# Patient Record
Sex: MALE | Race: WHITE
[De-identification: names, ages, dates, MRNs, and addresses within clinical notes are randomized per-mention and may not be internally consistent; named-entity substitution may affect disease eponyms.]

---

## 2018-09-11 ENCOUNTER — HOSPITAL ENCOUNTER (OUTPATIENT)
Age: 48
End: 2018-09-11
Payer: COMMERCIAL

## 2018-09-11 DIAGNOSIS — Z01.818: Primary | ICD-10-CM

## 2018-09-11 LAB
ALANINE AMINOTRANSFER ALT/SGPT: 26 U/L (ref 16–61)
ALBUMIN SERPL-MCNC: 3.7 G/DL (ref 3.2–5)
ALKALINE PHOSPHATASE: 58 U/L (ref 45–117)
ANION GAP: 11 (ref 5–15)
AST(SGOT): 20 U/L (ref 15–37)
BUN SERPL-MCNC: 21 MG/DL (ref 7–18)
BUN/CREAT RATIO: 21 RATIO (ref 10–20)
CALCIUM SERPL-MCNC: 8.7 MG/DL (ref 8.5–10.1)
CARBON DIOXIDE: 26 MMOL/L (ref 21–32)
CHLORIDE: 104 MMOL/L (ref 98–107)
DEPRECATED RDW RBC: 46.7 FL (ref 35.1–43.9)
DIFFERENTIAL COMMENT: (no result)
DIFFERENTIAL INDICATED: (no result)
ERYTHROCYTE [DISTWIDTH] IN BLOOD: 14.5 % (ref 11.6–14.6)
EST GLOM FILT RATE - AFR AMER: 102 ML/MIN (ref 60–?)
GLOBULIN: 5 G/DL (ref 2.2–4.2)
GLUCOSE: 95 MG/DL (ref 74–106)
HCT VFR BLD AUTO: 40.1 % (ref 40–54)
HEMOGLOBIN: 13.2 G/DL (ref 13–16.5)
HGB BLD-MCNC: 13.2 G/DL (ref 13–16.5)
IMMATURE GRANULOCYTES COUNT: 0 X10^3/UL (ref 0–0)
MANUAL DIF COMMENT BLD-IMP: (no result)
MCV RBC: 88.1 FL (ref 80–94)
MEAN CORP HGB CONC: 32.9 G/GL (ref 32–36)
MEAN PLATELET VOL.: 8.7 FL (ref 6.2–12)
PLATELET # BLD: 199 K/MM3 (ref 150–450)
PLATELET COUNT: 199 K/MM3 (ref 150–450)
POSITIVE COUNT: NO
POSITIVE DIFFERENTIAL: YES
POSITIVE MORPHOLOGY: NO
POTASSIUM: 4 MMOL/L (ref 3.5–5.1)
RBC # BLD AUTO: 4.55 M/MM3 (ref 4.6–6.2)
RBC DISTRIBUTION WIDTH CV: 14.5 % (ref 11.6–14.6)
RBC DISTRIBUTION WIDTH SD: 46.7 FL (ref 35.1–43.9)
RBC MORPH BLD: (no result) NORMAL
RED CELL MORPHOLOGY: (no result) NORMAL
SCAN SMEAR PER REVIEW CRITERIA: (no result)
WBC # BLD AUTO: 2.9 K/MM3 (ref 4.4–11)
WHITE BLOOD COUNT: 2.9 K/MM3 (ref 4.4–11)

## 2018-09-11 PROCEDURE — 85025 COMPLETE CBC W/AUTO DIFF WBC: CPT

## 2018-09-11 PROCEDURE — 80053 COMPREHEN METABOLIC PANEL: CPT

## 2018-09-11 PROCEDURE — 36415 COLL VENOUS BLD VENIPUNCTURE: CPT

## 2018-09-21 ENCOUNTER — HOSPITAL ENCOUNTER (OUTPATIENT)
Dept: HOSPITAL 100 - SDC | Age: 48
Discharge: HOME | End: 2018-09-21
Payer: COMMERCIAL

## 2018-09-21 VITALS
TEMPERATURE: 98.78 F | RESPIRATION RATE: 16 BRPM | OXYGEN SATURATION: 97 % | DIASTOLIC BLOOD PRESSURE: 89 MMHG | HEART RATE: 72 BPM | SYSTOLIC BLOOD PRESSURE: 130 MMHG

## 2018-09-21 VITALS
HEART RATE: 76 BPM | DIASTOLIC BLOOD PRESSURE: 89 MMHG | OXYGEN SATURATION: 100 % | SYSTOLIC BLOOD PRESSURE: 130 MMHG | RESPIRATION RATE: 16 BRPM | TEMPERATURE: 98 F

## 2018-09-21 VITALS
OXYGEN SATURATION: 100 % | DIASTOLIC BLOOD PRESSURE: 89 MMHG | TEMPERATURE: 98.1 F | HEART RATE: 79 BPM | RESPIRATION RATE: 18 BRPM | SYSTOLIC BLOOD PRESSURE: 130 MMHG

## 2018-09-21 VITALS — SYSTOLIC BLOOD PRESSURE: 130 MMHG | DIASTOLIC BLOOD PRESSURE: 89 MMHG

## 2018-09-21 VITALS — BODY MASS INDEX: 23.8 KG/M2

## 2018-09-21 VITALS
SYSTOLIC BLOOD PRESSURE: 118 MMHG | DIASTOLIC BLOOD PRESSURE: 89 MMHG | HEART RATE: 76 BPM | RESPIRATION RATE: 16 BRPM | OXYGEN SATURATION: 100 %

## 2018-09-21 VITALS
RESPIRATION RATE: 18 BRPM | SYSTOLIC BLOOD PRESSURE: 133 MMHG | DIASTOLIC BLOOD PRESSURE: 89 MMHG | HEART RATE: 75 BPM | OXYGEN SATURATION: 100 %

## 2018-09-21 VITALS
HEART RATE: 74 BPM | DIASTOLIC BLOOD PRESSURE: 81 MMHG | OXYGEN SATURATION: 100 % | RESPIRATION RATE: 16 BRPM | SYSTOLIC BLOOD PRESSURE: 130 MMHG

## 2018-09-21 DIAGNOSIS — Z87.891: ICD-10-CM

## 2018-09-21 DIAGNOSIS — M20.41: Primary | ICD-10-CM

## 2018-09-21 DIAGNOSIS — M06.371: ICD-10-CM

## 2018-09-21 PROCEDURE — 73620 X-RAY EXAM OF FOOT: CPT

## 2018-09-21 PROCEDURE — 88304 TISSUE EXAM BY PATHOLOGIST: CPT

## 2018-09-21 PROCEDURE — 88305 TISSUE EXAM BY PATHOLOGIST: CPT

## 2018-09-21 PROCEDURE — 28090 REMOVAL OF FOOT LESION: CPT

## 2018-09-21 PROCEDURE — 88311 DECALCIFY TISSUE: CPT

## 2018-09-21 PROCEDURE — 76000 FLUOROSCOPY <1 HR PHYS/QHP: CPT

## 2018-09-21 PROCEDURE — 28285 REPAIR OF HAMMERTOE: CPT

## 2018-09-21 RX ADMIN — CEFAZOLIN 150 GM: 10 INJECTION, POWDER, FOR SOLUTION INTRAVENOUS at 07:29

## 2018-11-27 ENCOUNTER — HOSPITAL ENCOUNTER (OUTPATIENT)
Age: 48
End: 2018-11-27
Payer: COMMERCIAL

## 2018-11-27 DIAGNOSIS — M05.79: Primary | ICD-10-CM

## 2018-11-27 LAB — CRP SERPL-MCNC: < 2.9 MG/L (ref 0–3)

## 2018-11-27 PROCEDURE — 86140 C-REACTIVE PROTEIN: CPT

## 2018-11-27 PROCEDURE — 85652 RBC SED RATE AUTOMATED: CPT

## 2019-11-25 ENCOUNTER — HOSPITAL ENCOUNTER (OUTPATIENT)
Age: 49
End: 2019-11-25
Payer: COMMERCIAL

## 2019-11-25 VITALS — BODY MASS INDEX: 23.8 KG/M2

## 2019-11-25 DIAGNOSIS — S62.522A: Primary | ICD-10-CM
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
## 2019-11-25 PROCEDURE — 73140 X-RAY EXAM OF FINGER(S): CPT

## 2019-11-25 PROCEDURE — 73130 X-RAY EXAM OF HAND: CPT

## 2022-12-14 ENCOUNTER — HOSPITAL ENCOUNTER (OUTPATIENT)
Age: 52
Discharge: HOME | End: 2022-12-14
Payer: COMMERCIAL

## 2022-12-14 DIAGNOSIS — N48.9: Primary | ICD-10-CM

## 2022-12-14 LAB — SAMPLE ADEQUACY CONTROL: (no result)

## 2022-12-14 PROCEDURE — 36415 COLL VENOUS BLD VENIPUNCTURE: CPT

## 2022-12-14 PROCEDURE — 86780 TREPONEMA PALLIDUM: CPT

## 2022-12-14 PROCEDURE — 87491 CHLMYD TRACH DNA AMP PROBE: CPT

## 2022-12-14 PROCEDURE — 86696 HERPES SIMPLEX TYPE 2 TEST: CPT

## 2022-12-14 PROCEDURE — 86695 HERPES SIMPLEX TYPE 1 TEST: CPT

## 2022-12-14 PROCEDURE — 87591 N.GONORRHOEAE DNA AMP PROB: CPT

## 2023-11-13 ENCOUNTER — APPOINTMENT (OUTPATIENT)
Dept: RHEUMATOLOGY | Facility: CLINIC | Age: 53
End: 2023-11-13
Payer: COMMERCIAL

## 2024-06-09 PROBLEM — Z79.622 LONG-TERM CURRENT USE OF TOFACITINIB: Status: ACTIVE | Noted: 2024-06-09

## 2024-06-09 PROBLEM — M65.351 TRIGGER LITTLE FINGER OF RIGHT HAND: Status: ACTIVE | Noted: 2024-06-09

## 2024-06-09 PROBLEM — M05.79 RHEUMATOID ARTHRITIS WITH RHEUMATOID FACTOR OF MULTIPLE SITES WITHOUT ORGAN OR SYSTEMS INVOLVEMENT (MULTI): Status: ACTIVE | Noted: 2024-06-09

## 2024-06-09 PROBLEM — M47.812 CERVICAL SPONDYLOSIS: Status: ACTIVE | Noted: 2024-06-09

## 2024-06-09 PROBLEM — M47.816 SPONDYLOSIS WITHOUT MYELOPATHY OR RADICULOPATHY, LUMBAR REGION: Status: ACTIVE | Noted: 2024-06-09

## 2024-06-10 ENCOUNTER — OFFICE VISIT (OUTPATIENT)
Dept: RHEUMATOLOGY | Facility: CLINIC | Age: 54
End: 2024-06-10
Payer: COMMERCIAL

## 2024-06-10 VITALS
HEIGHT: 73 IN | BODY MASS INDEX: 26.74 KG/M2 | OXYGEN SATURATION: 97 % | TEMPERATURE: 97.7 F | HEART RATE: 73 BPM | WEIGHT: 201.8 LBS | SYSTOLIC BLOOD PRESSURE: 141 MMHG | DIASTOLIC BLOOD PRESSURE: 84 MMHG

## 2024-06-10 DIAGNOSIS — Z79.622 LONG-TERM CURRENT USE OF TOFACITINIB: ICD-10-CM

## 2024-06-10 DIAGNOSIS — M05.79 RHEUMATOID ARTHRITIS WITH RHEUMATOID FACTOR OF MULTIPLE SITES WITHOUT ORGAN OR SYSTEMS INVOLVEMENT (MULTI): Primary | ICD-10-CM

## 2024-06-10 PROBLEM — H04.129 DRY EYE SYNDROME: Status: RESOLVED | Noted: 2017-01-23 | Resolved: 2024-06-10

## 2024-06-10 PROCEDURE — 99214 OFFICE O/P EST MOD 30 MIN: CPT | Performed by: INTERNAL MEDICINE

## 2024-06-10 PROCEDURE — 1036F TOBACCO NON-USER: CPT | Performed by: INTERNAL MEDICINE

## 2024-06-10 RX ORDER — TOFACITINIB 11 MG/1
11 TABLET, FILM COATED, EXTENDED RELEASE ORAL DAILY
Qty: 90 TABLET | Refills: 3 | Status: SHIPPED | OUTPATIENT
Start: 2024-06-10 | End: 2025-06-10

## 2024-06-10 RX ORDER — TOFACITINIB 11 MG/1
11 TABLET, FILM COATED, EXTENDED RELEASE ORAL DAILY
COMMUNITY
Start: 2024-06-04 | End: 2024-06-10 | Stop reason: SDUPTHER

## 2024-06-10 ASSESSMENT — ENCOUNTER SYMPTOMS
COUGH: 0
SHORTNESS OF BREATH: 0
MYALGIAS: 0
FATIGUE: 0
ARTHRALGIAS: 1
BACK PAIN: 0
JOINT SWELLING: 0
NUMBNESS: 0
FEVER: 0
WEAKNESS: 0
COLOR CHANGE: 0

## 2024-06-10 ASSESSMENT — PATIENT HEALTH QUESTIONNAIRE - PHQ9
1. LITTLE INTEREST OR PLEASURE IN DOING THINGS: NOT AT ALL
2. FEELING DOWN, DEPRESSED OR HOPELESS: NOT AT ALL
SUM OF ALL RESPONSES TO PHQ9 QUESTIONS 1 AND 2: 0

## 2024-06-10 NOTE — LETTER
Leanne 10, 2024     Luly Chirinos MD  [address]  [address]  [facility] [address]    Patient: Eliceo Ojeda   YOB: 1970   Date of Visit: 6/10/2024       Dear Dr. Luly Chirinos MD:    Thank you for referring Eliceo Ojeda to me for evaluation. Below are my notes for this visit  If you have questions, please do not hesitate to call me. I look forward to following your patient along with you.       Sincerely,     Tiffanie Currie MD      CC: No Recipients  ______________________________________________________________________________________                                                    [facility] RHEUMATOLOGY     AND INTERNAL MEDICINE    RHEUMATOLOGY PROGRESS NOTE  Eliceo Ojeda 54 y.o. male  Chief Complaint   Patient presents with   • Arthritis       SUBJECTIVE  Pt reports he is dong well.  Minimal pain.  Continues to have weakness in his  in her Right hand which is unchanged.   No new areas of pain.  No side effects with medication      Records since last seen reviewed in [facility], [facility] and [facility] Record  Patient Active Problem List    Diagnosis Date Noted   • Cervical spondylosis 06/09/2024   • Spondylosis without myelopathy or radiculopathy, lumbar region 06/09/2024   • Long-term current use of tofacitinib 06/09/2024   • Rheumatoid arthritis with rheumatoid factor of multiple sites without organ or systems involvement (Multi) 06/09/2024   • Trigger little finger of right hand 06/09/2024     Past Medical History:   Diagnosis Date   • Consumes 2 to 3 servings of caffeine per day    • Dry eye syndrome 01/23/2017   • 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   Respiratory:  Negative for cough and shortness of breath.    Cardiovascular:  Negative for leg swelling.   Musculoskeletal:  Positive for arthralgias. Negative for back pain, gait problem, joint swelling and myalgias.   Skin:  Negative for color change and rash.   Neurological:  Negative for weakness and numbness.     Social History     Tobacco Use   • Smoking status: Former     Types: Cigarettes   • Smokeless tobacco: Never   Substance Use Topics   • Alcohol use: Yes     PHYSICAL EXAM  /84   Pulse 73   Temp 36.5 °C (97.7 °F)   Ht 1.854 m (6' 1\")   Wt 91.5 kg (201 lb 12.8 oz)   SpO2 97%   BMI 26.62 kg/m²   Physical Exam  Vitals reviewed.   Constitutional:       General: He is not in acute distress.     Appearance: Normal appearance.   HENT:      Head: Normocephalic and atraumatic.   Eyes:      General: No scleral icterus.     Extraocular Movements: Extraocular movements intact.      Conjunctiva/sclera: Conjunctivae normal.      Pupils: Pupils are equal, round, and reactive to light.   Pulmonary:      Effort: Pulmonary effort is normal. No respiratory distress.   Musculoskeletal:         General: No swelling, tenderness or deformity. Normal range of motion.      Cervical back: Normal range of motion and neck supple. No tenderness.      Right lower leg: No edema.      Left lower leg: No edema.      Comments: No synovitis noted on exam  Difficulty fully extending fingers of R hand and decreased hand    Skin:     Findings: No bruising or rash.   Neurological:      General: No focal deficit present.      Mental Status: He is alert and oriented to person, place, and time. Mental status is at baseline.      Gait: Gait normal.   Psychiatric:         Mood and Affect: Mood normal.       Health Maintenance Due   Topic Date Due   • Yearly Adult Physical  Never done   • HIV Screening  Never done   • Colorectal Cancer Screening  Never done   • Hepatitis C " Screening  Never done   • Diabetes Screening  Never done   • Hepatitis B Vaccines (1 of 3 - 19+ 3-dose series) Never done   • Zoster Vaccines (1 of 2) Never done   • COVID-19 Vaccine (1 - 2023-24 season) Never done     Assessment/plan  Problem List Items Addressed This Visit       Long-term current use of tofacitinib    Rheumatoid arthritis with rheumatoid factor of multiple sites without organ or systems involvement (Multi) - Primary    Overview     Previous enbrel, humira  Current:  xeljanz         Current Assessment & Plan     Doing well on medication without any signs of disease progression.  Does have some ligament damage in R hand--unchanged.   Pt able to compensate for it.  Pt to continue med  Labs ordered today to monitor for increased disease activity, end organ manifestations and to monitor for any drug toxicities due to chronic medications           Relevant Medications    Xeljanz XR 11 mg tablet extended release 24 hr    Other Relevant Orders    CBC and Auto Differential    Comprehensive Metabolic Panel    C-Reactive Protein    Sedimentation Rate     Follow up: __4___months

## 2024-06-10 NOTE — PROGRESS NOTES
[facility] RHEUMATOLOGY     AND INTERNAL MEDICINE    RHEUMATOLOGY PROGRESS NOTE  Eliceo Ojeda 54 y.o. male  Chief Complaint   Patient presents with    Arthritis       SUBJECTIVE  Pt reports he is dong well.  Minimal pain.  Continues to have weakness in his  in her Right hand which is unchanged.   No new areas of pain.  No side effects with medication      Records since last seen reviewed in [facility], [facility] and [facility] Record  Patient Active Problem List    Diagnosis Date Noted    Cervical spondylosis 06/09/2024    Spondylosis without myelopathy or radiculopathy, lumbar region 06/09/2024    Long-term current use of tofacitinib 06/09/2024    Rheumatoid arthritis with rheumatoid factor of multiple sites without organ or systems involvement (Multi) 06/09/2024    Trigger little finger of right hand 06/09/2024     Past Medical History:   Diagnosis Date    Consumes 2 to 3 servings of caffeine per day     Dry eye syndrome 01/23/2017    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   Respiratory:  Negative for cough and shortness of breath.    Cardiovascular:  Negative for leg swelling.   Musculoskeletal:  Positive for arthralgias. Negative for back pain, gait problem, joint swelling and myalgias.   Skin:  Negative for color change and rash.   Neurological:  Negative for weakness and numbness.     Social History     Tobacco Use    Smoking status: Former     Types: Cigarettes    Smokeless tobacco: Never   Substance Use Topics    Alcohol use: Yes     PHYSICAL EXAM  /84   Pulse 73   Temp 36.5 °C (97.7 °F)   Ht  "1.854 m (6' 1\")   Wt 91.5 kg (201 lb 12.8 oz)   SpO2 97%   BMI 26.62 kg/m²   Physical Exam  Vitals reviewed.   Constitutional:       General: He is not in acute distress.     Appearance: Normal appearance.   HENT:      Head: Normocephalic and atraumatic.   Eyes:      General: No scleral icterus.     Extraocular Movements: Extraocular movements intact.      Conjunctiva/sclera: Conjunctivae normal.      Pupils: Pupils are equal, round, and reactive to light.   Pulmonary:      Effort: Pulmonary effort is normal. No respiratory distress.   Musculoskeletal:         General: No swelling, tenderness or deformity. Normal range of motion.      Cervical back: Normal range of motion and neck supple. No tenderness.      Right lower leg: No edema.      Left lower leg: No edema.      Comments: No synovitis noted on exam  Difficulty fully extending fingers of R hand and decreased hand    Skin:     Findings: No bruising or rash.   Neurological:      General: No focal deficit present.      Mental Status: He is alert and oriented to person, place, and time. Mental status is at baseline.      Gait: Gait normal.   Psychiatric:         Mood and Affect: Mood normal.       Health Maintenance Due   Topic Date Due    Yearly Adult Physical  Never done    HIV Screening  Never done    Colorectal Cancer Screening  Never done    Hepatitis C Screening  Never done    Diabetes Screening  Never done    Hepatitis B Vaccines (1 of 3 - 19+ 3-dose series) Never done    Zoster Vaccines (1 of 2) Never done    COVID-19 Vaccine (1 - 2023-24 season) Never done     Assessment/plan  Problem List Items Addressed This Visit       Long-term current use of tofacitinib    Rheumatoid arthritis with rheumatoid factor of multiple sites without organ or systems involvement (Multi) - Primary    Overview     Previous enbrel, humira  Current:  xeljanz         Current Assessment & Plan     Doing well on medication without any signs of disease progression.  Does have " some ligament damage in R hand--unchanged.   Pt able to compensate for it.  Pt to continue med  Labs ordered today to monitor for increased disease activity, end organ manifestations and to monitor for any drug toxicities due to chronic medications           Relevant Medications    Xeljanz XR 11 mg tablet extended release 24 hr    Other Relevant Orders    CBC and Auto Differential    Comprehensive Metabolic Panel    C-Reactive Protein    Sedimentation Rate     Follow up: __4___months

## 2024-06-10 NOTE — ASSESSMENT & PLAN NOTE
Doing well on medication without any signs of disease progression.  Does have some ligament damage in R hand--unchanged.   Pt able to compensate for it.  Pt to continue med  Labs ordered today to monitor for increased disease activity, end organ manifestations and to monitor for any drug toxicities due to chronic medications

## 2024-06-10 NOTE — PATIENT INSTRUCTIONS
"It was a pleasure to see you today  Please call if your symptoms worsen  Please review your summary for education and reminders.  Follow up at your next appointment.    If you had labs/xrays done today, you will be able to view on [facility].   We will contact you when the results are reviewed for further discussion.  Please note that you may receive your results before I have had a chance to review.  Please know I will be contacting you for discussion  Homegoing instructions for all patient  A healthy lifestyle helps chronic diseases  These are all the goals you should strive to improve your overall health   Blood pressure <130/85   BMI of <30 or waist circumference that is 1/2 of your height   Fasting blood sugar <107 (if you are diabetic, aim for an A1c <6.4%_   LDL cholesterol <130   Avoid smoking   Manage your stress   Get your preventive exams   Get your immunizations   What else for RA?    Any type of exercise is better than nothing.  Whether you do cardio, walking, lift weights or yoga, all can help in improving physical function.  Occupational and physical therapy can improve physical function and decrease pain by providing tools and techniques to improve your day.  We can do a referral if you haven't seen one yet  Braces and splints for affected joints can also help  If your gait is affected, strongly recommend assistive devices like canes or walkers.  We don't want to risk injuries from falls.  Can Diet help?   Consider the Mediterranean diet  There are some foods that are considered \"inflammatory\"  Look up anti-inflammatory diets for a list of foods to avoid.  No dietary supplements help unfortunately.  Work on getting to a normal weight/BMI.  This will lessen the stress on your joints.  What else?   Acupuncture   Massage therapy   Apply heat to affected joints  We do not recommend chiropractic care as it has not been shown to help in RA.  If you smoke, stop     (From 2022ACR guidelines for exercise, rehab " and diet in RA)

## 2024-07-03 LAB
ALANINE AMINOTRANSFERASE (SGPT) (U/L) IN SER/PLAS EXTERNAL: 13 U/L
ALBUMIN (G/DL) IN SER/PLAS EXTERNAL: 4.3 G/DL
ALKALINE PHOSPHATASE (U/L) IN SER/PLAS EXTERNAL: 53 U/L
ANION GAP IN SER/PLAS EXTERNAL: NORMAL
ASPARTATE AMINOTRANSFERASE (SGOT) (U/L) IN SER/PLAS EXTERNAL: 16 U/L
BILIRUBIN TOTAL (MG/DL) IN SER/PLAS EXTERNAL: 0.4 MG/DL
CALCIUM (MG/DL) IN SER/PLAS EXTERNAL: 9.2 MG/DL
CARBON DIOXIDE, TOTAL (MMOL/L) IN SER/PLAS EXTERNAL: 24 MMOL/L
CHLORIDE (MMOL/L) IN SER/PLAS EXTERNAL: 102 MMOL/L
CORTISOL (UG/DL) IN SER/PLAS EXTERNAL: 0.5 UG/DL
CREATININE (MG/DL) IN SER/PLAS EXTERNAL: 0.82 MG/DL
ERYTHROCYTE DISTRIBUTION WIDTH (RATIO) BY AUTOMATED COUNT EXTERNAL: 14.5 %
ERYTHROCYTE MEAN CORPUSCULAR HEMOGLOBIN (PG) BY AUTOMATED COUNT EXTERNAL: 30.7 PG
ERYTHROCYTE MEAN CORPUSCULAR HGB CONCENTRATION (G/DL) BY AUTOMATED EXT: 32.9 G/DL
ERYTHROCYTE MEAN CORPUSCULAR VOLUME (FL) BY AUTOMATED COUNT EXTERNAL: 93 FL
ERYTHROCYTES (10*6/UL) IN BLOOD BY AUTOMATED COUNT EXTERNAL: 4.63 X10*6/UL
GLOMERULAR FILTRATION RATE ML/MIN/1.73 SQ M.PREDICTED EXTERNAL: 103 ML/MIN/1.73M*2
GLUCOSE (MG/DL) IN SER/PLAS EXTERNAL: 96 MG/DL
HEMATOCRIT (%) IN BLOOD BY AUTOMATED COUNT EXTERNAL: 43.1 %
HEMOGLOBIN (G/DL) IN BLOOD EXTERNAL: 14.2 G/DL
LEUKOCYTES (10*3/UL) IN BLOOD BY AUTOMATED COUNT EXTERNAL: 4.4 X10*3/UL
NRBC (PER 100 WBCS) BY AUTOMATED COUNT EXTERNAL: 0 /100 WBCS
PLATELET MEAN VOLUME (FL) IN BLOOD BY AUTOMATED COUNT EXTERNAL: NORMAL
PLATELETS (10*3/UL) IN BLOOD AUTOMATED COUNT EXTERNAL: 217 X10*3/UL
POTASSIUM (MMOL/L) IN SER/PLAS EXTERMA;: 4.3 MMOL/L
PROTEIN TOTAL EXTERNAL: 7.2 G/DL
SEDIMENTATION RATE, ERYTHROCYTE EXTERNAL: 7 MM/H
SODIUM (MMOL/L) IN SER/PLAS EXTERNAL: 140 MMOL/L
UREA NITROGEN (MG/DL) IN SER/PLAS EXTERNAL: 17 MG/DL

## 2024-08-01 ENCOUNTER — TELEPHONE (OUTPATIENT)
Dept: PRIMARY CARE | Facility: CLINIC | Age: 54
End: 2024-08-01
Payer: COMMERCIAL

## 2024-08-01 DIAGNOSIS — M05.79 RHEUMATOID ARTHRITIS WITH RHEUMATOID FACTOR OF MULTIPLE SITES WITHOUT ORGAN OR SYSTEMS INVOLVEMENT (MULTI): Primary | ICD-10-CM

## 2024-08-01 RX ORDER — PREDNISONE 10 MG/1
TABLET ORAL
Qty: 30 TABLET | Refills: 0 | Status: SHIPPED | OUTPATIENT
Start: 2024-08-01 | End: 2024-08-12

## 2024-08-01 NOTE — TELEPHONE ENCOUNTER
Pt called asking for Prednisone.  He is going on a trip and will be in a vehicle for 20 hours.  He is anticipating he will get a flare up.       [id number] - [place], [place] - [address] [place] [address] AT [facility] ON [address]  [address] [place] [id number]  Phone: [phone] Fax: [phone]

## 2024-08-22 DIAGNOSIS — M05.79 RHEUMATOID ARTHRITIS WITH RHEUMATOID FACTOR OF MULTIPLE SITES WITHOUT ORGAN OR SYSTEMS INVOLVEMENT (MULTI): ICD-10-CM

## 2024-08-22 RX ORDER — TOFACITINIB 11 MG/1
11 TABLET, FILM COATED, EXTENDED RELEASE ORAL DAILY
Qty: 90 TABLET | Refills: 3 | Status: SHIPPED | OUTPATIENT
Start: 2024-08-22 | End: 2024-08-26 | Stop reason: SDUPTHER

## 2024-08-23 ENCOUNTER — SPECIALTY PHARMACY (OUTPATIENT)
Dept: PHARMACY | Facility: CLINIC | Age: 54
End: 2024-08-23

## 2024-08-26 DIAGNOSIS — M05.79 RHEUMATOID ARTHRITIS WITH RHEUMATOID FACTOR OF MULTIPLE SITES WITHOUT ORGAN OR SYSTEMS INVOLVEMENT (MULTI): ICD-10-CM

## 2024-08-26 RX ORDER — TOFACITINIB 11 MG/1
11 TABLET, FILM COATED, EXTENDED RELEASE ORAL DAILY
Qty: 90 TABLET | Refills: 3 | Status: SHIPPED | OUTPATIENT
Start: 2024-08-26 | End: 2025-08-26

## 2024-08-26 NOTE — PROGRESS NOTES
Xeljanz 11mg tab PA approved from 08/24/2024-08/24/2025. Per insurance pt must fill with [facility]. Updated [facility]el[facility]anz rx rerouted to [facility]

## 2024-08-27 ENCOUNTER — TELEPHONE (OUTPATIENT)
Dept: PRIMARY CARE | Facility: CLINIC | Age: 54
End: 2024-08-27
Payer: COMMERCIAL

## 2024-08-27 NOTE — TELEPHONE ENCOUNTER
Left message for refill of xeljanz to [facility], been out for 8 days states needs PA, leaving to go to [place] for 2 weeks Friday

## 2024-10-31 ENCOUNTER — APPOINTMENT (OUTPATIENT)
Dept: RHEUMATOLOGY | Facility: CLINIC | Age: 54
End: 2024-10-31
Payer: COMMERCIAL

## 2024-10-31 VITALS
OXYGEN SATURATION: 99 % | BODY MASS INDEX: 28.69 KG/M2 | DIASTOLIC BLOOD PRESSURE: 80 MMHG | HEIGHT: 72 IN | HEART RATE: 66 BPM | WEIGHT: 211.8 LBS | TEMPERATURE: 97.7 F | SYSTOLIC BLOOD PRESSURE: 130 MMHG

## 2024-10-31 DIAGNOSIS — M05.79 RHEUMATOID ARTHRITIS WITH RHEUMATOID FACTOR OF MULTIPLE SITES WITHOUT ORGAN OR SYSTEMS INVOLVEMENT (MULTI): Primary | ICD-10-CM

## 2024-10-31 DIAGNOSIS — Z79.622 LONG-TERM CURRENT USE OF TOFACITINIB: ICD-10-CM

## 2024-10-31 PROCEDURE — 3008F BODY MASS INDEX DOCD: CPT | Performed by: INTERNAL MEDICINE

## 2024-10-31 PROCEDURE — 1036F TOBACCO NON-USER: CPT | Performed by: INTERNAL MEDICINE

## 2024-10-31 PROCEDURE — 99214 OFFICE O/P EST MOD 30 MIN: CPT | Performed by: INTERNAL MEDICINE

## 2024-10-31 ASSESSMENT — ENCOUNTER SYMPTOMS
BACK PAIN: 0
SHORTNESS OF BREATH: 0
COUGH: 0
JOINT SWELLING: 0
WEAKNESS: 0
MYALGIAS: 0
NUMBNESS: 0
FEVER: 0
FATIGUE: 0
ARTHRALGIAS: 1
COLOR CHANGE: 0

## 2024-10-31 ASSESSMENT — PATIENT HEALTH QUESTIONNAIRE - PHQ9
2. FEELING DOWN, DEPRESSED OR HOPELESS: NOT AT ALL
1. LITTLE INTEREST OR PLEASURE IN DOING THINGS: NOT AT ALL
SUM OF ALL RESPONSES TO PHQ9 QUESTIONS 1 & 2: 0

## 2024-11-15 ENCOUNTER — TELEPHONE (OUTPATIENT)
Dept: RHEUMATOLOGY | Facility: CLINIC | Age: 54
End: 2024-11-15
Payer: COMMERCIAL

## 2024-11-15 NOTE — TELEPHONE ENCOUNTER
----- Message from Tiffanie Currie sent at 11/15/2024 12:51 PM EST -----  Let pt know that labs done this week are all stable and no signs that the disease is active

## 2025-03-03 ENCOUNTER — APPOINTMENT (OUTPATIENT)
Dept: RHEUMATOLOGY | Facility: CLINIC | Age: 55
End: 2025-03-03
Payer: COMMERCIAL

## 2025-03-03 VITALS
BODY MASS INDEX: 29 KG/M2 | SYSTOLIC BLOOD PRESSURE: 129 MMHG | WEIGHT: 214.1 LBS | OXYGEN SATURATION: 98 % | HEIGHT: 72 IN | TEMPERATURE: 97.7 F | DIASTOLIC BLOOD PRESSURE: 88 MMHG | HEART RATE: 81 BPM

## 2025-03-03 DIAGNOSIS — M05.79 RHEUMATOID ARTHRITIS WITH RHEUMATOID FACTOR OF MULTIPLE SITES WITHOUT ORGAN OR SYSTEMS INVOLVEMENT (MULTI): Primary | ICD-10-CM

## 2025-03-03 DIAGNOSIS — Z79.622 LONG-TERM CURRENT USE OF TOFACITINIB: ICD-10-CM

## 2025-03-03 PROBLEM — E78.5 HYPERLIPIDEMIA: Status: ACTIVE | Noted: 2025-03-03

## 2025-03-03 PROCEDURE — 1036F TOBACCO NON-USER: CPT | Performed by: INTERNAL MEDICINE

## 2025-03-03 PROCEDURE — 99214 OFFICE O/P EST MOD 30 MIN: CPT | Performed by: INTERNAL MEDICINE

## 2025-03-03 PROCEDURE — 3008F BODY MASS INDEX DOCD: CPT | Performed by: INTERNAL MEDICINE

## 2025-03-03 RX ORDER — ROSUVASTATIN CALCIUM 5 MG/1
5 TABLET, COATED ORAL NIGHTLY
COMMUNITY
Start: 2025-01-21

## 2025-03-03 ASSESSMENT — ENCOUNTER SYMPTOMS
EYES NEGATIVE: 1
WEAKNESS: 0
COLOR CHANGE: 0
JOINT SWELLING: 0
COUGH: 0
FEVER: 0
PSYCHIATRIC NEGATIVE: 1
ARTHRALGIAS: 1
SHORTNESS OF BREATH: 0
ENDOCRINE NEGATIVE: 1
NUMBNESS: 0
MYALGIAS: 0
FATIGUE: 0
BACK PAIN: 0
GASTROINTESTINAL NEGATIVE: 1

## 2025-03-03 ASSESSMENT — PATIENT HEALTH QUESTIONNAIRE - PHQ9
2. FEELING DOWN, DEPRESSED OR HOPELESS: NOT AT ALL
SUM OF ALL RESPONSES TO PHQ9 QUESTIONS 1 & 2: 0
1. LITTLE INTEREST OR PLEASURE IN DOING THINGS: NOT AT ALL

## 2025-03-03 NOTE — PATIENT INSTRUCTIONS
"It was a pleasure to see you today  Please call if your symptoms worsen  Please review your summary for education and reminders.  Follow up at your next appointment.    If you had labs/xrays done today, you will be able to view on [facility].   We will contact you when the results are reviewed for further discussion.  Please note that you may receive your results before I have had a chance to review.  Please know I will be contacting you for discussion  Homegoing instructions for all patient  A healthy lifestyle helps chronic diseases  These are all the goals you should strive to improve your overall health   Blood pressure <130/85   BMI of <30 or waist circumference that is 1/2 of your height   Fasting blood sugar <107 (if you are diabetic, aim for an A1c <6.4%_   LDL cholesterol <130   Avoid smoking   Manage your stress   Get your preventive exams   Get your immunizations   What else for RA?    Any type of exercise is better than nothing.  Whether you do cardio, walking, lift weights or yoga, all can help in improving physical function.  Occupational and physical therapy can improve physical function and decrease pain by providing tools and techniques to improve your day.  We can do a referral if you haven't seen one yet  Braces and splints for affected joints can also help  If your gait is affected, strongly recommend assistive devices like canes or walkers.  We don't want to risk injuries from falls.  Can Diet help?   Consider the Mediterranean diet  There are some foods that are considered \"inflammatory\"  Look up anti-inflammatory diets for a list of foods to avoid.  No dietary supplements help unfortunately.  Work on getting to a normal weight/BMI.  This will lessen the stress on your joints.  What else?   Acupuncture   Massage therapy   Apply heat to affected joints  We do not recommend chiropractic care as it has not been shown to help in RA.  If you smoke, stop     (From 2022ACR guidelines for exercise, rehab " and diet in RA)

## 2025-03-03 NOTE — PROGRESS NOTES
[facility] RHEUMATOLOGY     AND INTERNAL MEDICINE    RHEUMATOLOGY PROGRESS NOTE    Eliceo Ojeda 54 y.o. male  :  1970  PCP:  Luly Chirinos MD    Chief Complaint   Patient presents with    Rheumatoid Arthritis       SUBJECTIVE  Pt reports he is feeling ok but did  have a lot more stiffness and achiness through this winter.   Had to call off a couple of times.   Even sitting in his truck would make him stiff.   No swelling.   Pt hopes things will settle down with improved weather.      Records since last seen reviewed in [facility], [facility] and [facility] Record  Patient Active Problem List    Diagnosis Date Noted    Cervical spondylosis 2024    Spondylosis without myelopathy or radiculopathy, lumbar region 2024    Long-term current use of tofacitinib 2024    Rheumatoid arthritis with rheumatoid factor of multiple sites without organ or systems involvement (Multi) 2024    Trigger little finger of right hand 2024     Past Medical History:   Diagnosis Date    Consumes 2 to 3 servings of caffeine per day     COVID-19 vaccine series declined     Dry eye syndrome 2017    Felty's syndrome 2012    Hepatitis C infection 2012    Rheumatoid nodule, unspecified site (Multi) 2022    Rheumatoid nodules    Unspecified cataract     Cataracts, bilateral     Current Outpatient Medications on File Prior to Visit   Medication Sig Dispense Refill    rosuvastatin (Crestor) 5 mg tablet Take 1 tablet (5 mg) by mouth once daily at bedtime.      Xeljanz XR 11 mg tablet extended release 24 hr Take 1 tablet (11 mg) by mouth once daily. 90 tablet 3     No current facility-administered medications on file prior to visit.     No Known Allergies  Social History     Tobacco Use    Smoking status: Former     Current packs/day: 0.00     Types: Cigarettes     Quit date:      Years since quittin.1    Smokeless tobacco:  Never   Vaping Use    Vaping status: Never Used   Substance Use Topics    Alcohol use: Yes     Comment: Socially    Drug use: Never     Review of Systems   Constitutional:  Negative for fatigue and fever.   HENT: Negative.     Eyes: Negative.    Respiratory:  Negative for cough and shortness of breath.    Cardiovascular:  Negative for leg swelling.   Gastrointestinal: Negative.    Endocrine: Negative.    Genitourinary: Negative.    Musculoskeletal:  Positive for arthralgias. Negative for back pain, gait problem, joint swelling and myalgias.   Skin:  Negative for color change and rash.   Neurological:  Negative for weakness and numbness.   Psychiatric/Behavioral: Negative.         PHYSICAL EXAM  /88   Pulse 81   Temp 36.5 °C (97.7 °F) (Temporal)   Ht 1.829 m (6')   Wt 97.1 kg (214 lb 1.6 oz)   SpO2 98%   BMI 29.04 kg/m²   Depression: Not at risk (3/3/2025)    PHQ-2     PHQ-2 Score: 0     Physical Exam  Vitals reviewed.   Constitutional:       General: He is not in acute distress.     Appearance: Normal appearance.   HENT:      Head: Normocephalic and atraumatic.   Eyes:      General: No scleral icterus.     Extraocular Movements: Extraocular movements intact.      Conjunctiva/sclera: Conjunctivae normal.      Pupils: Pupils are equal, round, and reactive to light.   Pulmonary:      Effort: Pulmonary effort is normal. No respiratory distress.   Musculoskeletal:         General: No swelling, tenderness or deformity. Normal range of motion.      Cervical back: Normal range of motion and neck supple. No tenderness.      Right lower leg: No edema.      Left lower leg: No edema.      Comments: No synovitis noted on exam  Difficulty fully extending fingers of R hand and decreased hand    Skin:     Findings: No bruising or rash.      Comments: Tattoo on L hand   Neurological:      General: No focal deficit present.      Mental Status: He is alert and oriented to person, place, and time. Mental status is at  baseline.      Gait: Gait normal.   Psychiatric:         Mood and Affect: Mood normal.           Health Maintenance Due   Topic Date Due    Yearly Adult Physical  Never done    HIV Screening  Never done    Colorectal Cancer Screening  Never done    Diabetes Screening  Never done    Hepatitis B Vaccines (1 of 3 - 19+ 3-dose series) Never done    Pneumococcal Vaccine (1 of 1 - PCV) Never done    Zoster Vaccines (1 of 2) Never done    Influenza Vaccine (1) 09/01/2024    Lipid Panel  10/23/2024       Assessment/plan  Assessment & Plan  Rheumatoid arthritis with rheumatoid factor of multiple sites without organ or systems involvement (Multi)  On xeljanz.   Increased symptoms recently but no synovitis.   Labs ordered today to monitor for increased disease activity, end organ manifestations and to monitor for any drug toxicities due to chronic medications    Orders:    CBC and Auto Differential; Future    Comprehensive Metabolic Panel; Future    C-Reactive Protein; Future    Sedimentation Rate; Future    Follow Up In Rheumatology; Future    Long-term current use of tofacitinib  No side effects  Orders:    Follow Up In Rheumatology; Future      Follow up: ___4__months, sooner if change in symptoms    Immunization History   Administered Date(s) Administered    Flu vaccine (IIV4), preservative free *Check age/dose* 10/09/2018, 11/21/2023    Influenza, Unspecified 10/20/2015    PPD Test 01/01/2011    Tdap vaccine, age 7 year and older (BOOSTRIX, ADACEL) 10/15/2015

## 2025-03-03 NOTE — LETTER
March 3, 2025     Luly Chirinos MD  [address]   [address]  [facility] [address]    Patient: Eliceo Ojeda   YOB: 1970   Date of Visit: 3/3/2025       Dear Dr. Luly Chirinos MD:    Thank you for referring Eliceo Ojeda to me for evaluation. Below are my notes for this visit  If you have questions, please do not hesitate to call me. I look forward to following your patient along with you.       Sincerely,     Tiffanie Currie MD      CC: No Recipients  ______________________________________________________________________________________                                                    [facility] RHEUMATOLOGY     AND INTERNAL MEDICINE    RHEUMATOLOGY PROGRESS NOTE    Eliceo Ojeda 54 y.o. male  :  1970  PCP:  Luly Chirinos MD    Chief Complaint   Patient presents with   • Rheumatoid Arthritis       SUBJECTIVE  Pt reports he is feeling ok but did  have a lot more stiffness and achiness through this winter.   Had to call off a couple of times.   Even sitting in his truck would make him stiff.   No swelling.   Pt hopes things will settle down with improved weather.      Records since last seen reviewed in [facility], [facility] and [facility] Record  Patient Active Problem List    Diagnosis Date Noted   • Cervical spondylosis 2024   • Spondylosis without myelopathy or radiculopathy, lumbar region 2024   • Long-term current use of tofacitinib 2024   • Rheumatoid arthritis with rheumatoid factor of multiple sites without organ or systems involvement (Multi) 2024   • Trigger little finger of right hand 2024     Past Medical History:   Diagnosis Date   • Consumes 2 to 3 servings of caffeine per day    • COVID-19 vaccine series declined    • Dry eye syndrome 2017   • Felty's syndrome 2012   • Hepatitis C infection 2012   • Rheumatoid nodule, unspecified site (Multi) 2022    Rheumatoid nodules   • Unspecified  cataract     Cataracts, bilateral     Current Outpatient Medications on File Prior to Visit   Medication Sig Dispense Refill   • rosuvastatin (Crestor) 5 mg tablet Take 1 tablet (5 mg) by mouth once daily at bedtime.     • Xeljanz XR 11 mg tablet extended release 24 hr Take 1 tablet (11 mg) by mouth once daily. 90 tablet 3     No current facility-administered medications on file prior to visit.     No Known Allergies  Social History     Tobacco Use   • Smoking status: Former     Current packs/day: 0.00     Types: Cigarettes     Quit date:      Years since quittin.1   • Smokeless tobacco: Never   Vaping Use   • Vaping status: Never Used   Substance Use Topics   • Alcohol use: Yes     Comment: Socially   • Drug use: Never     Review of Systems   Constitutional:  Negative for fatigue and fever.   HENT: Negative.     Eyes: Negative.    Respiratory:  Negative for cough and shortness of breath.    Cardiovascular:  Negative for leg swelling.   Gastrointestinal: Negative.    Endocrine: Negative.    Genitourinary: Negative.    Musculoskeletal:  Positive for arthralgias. Negative for back pain, gait problem, joint swelling and myalgias.   Skin:  Negative for color change and rash.   Neurological:  Negative for weakness and numbness.   Psychiatric/Behavioral: Negative.         PHYSICAL EXAM  /88   Pulse 81   Temp 36.5 °C (97.7 °F) (Temporal)   Ht 1.829 m (6')   Wt 97.1 kg (214 lb 1.6 oz)   SpO2 98%   BMI 29.04 kg/m²   Depression: Not at risk (3/3/2025)    PHQ-2    • PHQ-2 Score: 0     Physical Exam  Vitals reviewed.   Constitutional:       General: He is not in acute distress.     Appearance: Normal appearance.   HENT:      Head: Normocephalic and atraumatic.   Eyes:      General: No scleral icterus.     Extraocular Movements: Extraocular movements intact.      Conjunctiva/sclera: Conjunctivae normal.      Pupils: Pupils are equal, round, and reactive to light.   Pulmonary:      Effort: Pulmonary effort is  normal. No respiratory distress.   Musculoskeletal:         General: No swelling, tenderness or deformity. Normal range of motion.      Cervical back: Normal range of motion and neck supple. No tenderness.      Right lower leg: No edema.      Left lower leg: No edema.      Comments: No synovitis noted on exam  Difficulty fully extending fingers of R hand and decreased hand    Skin:     Findings: No bruising or rash.      Comments: Tattoo on L hand   Neurological:      General: No focal deficit present.      Mental Status: He is alert and oriented to person, place, and time. Mental status is at baseline.      Gait: Gait normal.   Psychiatric:         Mood and Affect: Mood normal.           Health Maintenance Due   Topic Date Due   • Yearly Adult Physical  Never done   • HIV Screening  Never done   • Colorectal Cancer Screening  Never done   • Diabetes Screening  Never done   • Hepatitis B Vaccines (1 of 3 - 19+ 3-dose series) Never done   • Pneumococcal Vaccine (1 of 1 - PCV) Never done   • Zoster Vaccines (1 of 2) Never done   • Influenza Vaccine (1) 09/01/2024   • Lipid Panel  10/23/2024       Assessment/plan  Assessment & Plan  Rheumatoid arthritis with rheumatoid factor of multiple sites without organ or systems involvement (Multi)  On xeljanz.   Increased symptoms recently but no synovitis.   Labs ordered today to monitor for increased disease activity, end organ manifestations and to monitor for any drug toxicities due to chronic medications    Orders:  •  CBC and Auto Differential; Future  •  Comprehensive Metabolic Panel; Future  •  C-Reactive Protein; Future  •  Sedimentation Rate; Future  •  Follow Up In Rheumatology; Future    Long-term current use of tofacitinib  No side effects  Orders:  •  Follow Up In Rheumatology; Future      Follow up: ___4__months, sooner if change in symptoms    Immunization History   Administered Date(s) Administered   • Flu vaccine (IIV4), preservative free *Check age/dose*  10/09/2018, 11/21/2023   • Influenza, Unspecified 10/20/2015   • PPD Test 01/01/2011   • Tdap vaccine, age 7 year and older (BOOSTRIX, ADACEL) 10/15/2015

## 2025-03-03 NOTE — ASSESSMENT & PLAN NOTE
On xeljanz.   Increased symptoms recently but no synovitis.   Labs ordered today to monitor for increased disease activity, end organ manifestations and to monitor for any drug toxicities due to chronic medications    Orders:    CBC and Auto Differential; Future    Comprehensive Metabolic Panel; Future    C-Reactive Protein; Future    Sedimentation Rate; Future    Follow Up In Rheumatology; Future     Statement Selected

## 2025-04-24 LAB
ALANINE AMINOTRANSFERASE (SGPT) (U/L) IN SER/PLAS EXTERNAL: 21 U/L
ALBUMIN (G/DL) IN SER/PLAS EXTERNAL: 4.7 G/DL
ALKALINE PHOSPHATASE (U/L) IN SER/PLAS EXTERNAL: 66 U/L
ASPARTATE AMINOTRANSFERASE (SGOT) (U/L) IN SER/PLAS EXTERNAL: 25 U/L
BILIRUBIN TOTAL (MG/DL) IN SER/PLAS EXTERNAL: 0.6 MG/DL
CALCIUM (MG/DL) IN SER/PLAS EXTERNAL: 9.3 MG/DL
CARBON DIOXIDE, TOTAL (MMOL/L) IN SER/PLAS EXTERNAL: 23 MMOL/L
CHLORIDE (MMOL/L) IN SER/PLAS EXTERNAL: 100 MMOL/L
CHOLESTEROL (MG/DL) IN SER/PLAS EXTERNAL: 182 MG/DL
CHOLESTEROL IN HDL (MG/DL) IN SER/PLAS EXTERNAL: 57 MG/DL
CHOLESTEROL IN LDL (MG/DL) IN SERUM OR PLASMA BY CALCULATION EXTERNAL: 92 MG/DL
CHOLESTEROL/HDL RATIO EXTERNAL: 3.2
CORTISOL (UG/DL) IN SER/PLAS EXTERNAL: 0.9 UG/DL
CREATININE (MG/DL) IN SER/PLAS EXTERNAL: 0.88 MG/DL
GLOMERULAR FILTRATION RATE ML/MIN/1.73 SQ M.PREDICTED EXTERNAL: 100 ML/MIN/1.73M*2
GLUCOSE (MG/DL) IN SER/PLAS EXTERNAL: 99 MG/DL
POTASSIUM (MMOL/L) IN SER/PLAS EXTERMA;: 4.5 MMOL/L
PROSTATE SPECIFIC AG (NG/ML) IN SER/PLAS EXTERNAL: 0.7 NG/ML
PROTEIN TOTAL EXTERNAL: 7.4 G/DL
SODIUM (MMOL/L) IN SER/PLAS EXTERNAL: 139 MMOL/L
THYROTROPIN (MIU/L) IN SER/PLAS BY DETECTION LIMIT <= 0.05 MIU/L EXTERNAL: 3.33 MIU/L
TRIGLYCERIDE (MG/DL) IN SER/PLAS EXTERNAL: 197 MG/DL
UREA NITROGEN (MG/DL) IN SER/PLAS EXTERNAL: 11 MG/DL
VLDL EXTERNAL: 33 MG/DL

## 2025-07-14 ENCOUNTER — APPOINTMENT (OUTPATIENT)
Dept: RHEUMATOLOGY | Facility: CLINIC | Age: 55
End: 2025-07-14
Payer: COMMERCIAL

## 2025-07-14 VITALS
OXYGEN SATURATION: 96 % | HEART RATE: 74 BPM | TEMPERATURE: 97.5 F | WEIGHT: 213.8 LBS | DIASTOLIC BLOOD PRESSURE: 91 MMHG | HEIGHT: 73 IN | SYSTOLIC BLOOD PRESSURE: 157 MMHG | BODY MASS INDEX: 28.34 KG/M2

## 2025-07-14 DIAGNOSIS — Z79.622 LONG-TERM CURRENT USE OF TOFACITINIB: ICD-10-CM

## 2025-07-14 DIAGNOSIS — M05.79 RHEUMATOID ARTHRITIS WITH RHEUMATOID FACTOR OF MULTIPLE SITES WITHOUT ORGAN OR SYSTEMS INVOLVEMENT (MULTI): Primary | ICD-10-CM

## 2025-07-14 PROCEDURE — 99214 OFFICE O/P EST MOD 30 MIN: CPT | Performed by: INTERNAL MEDICINE

## 2025-07-14 PROCEDURE — 1036F TOBACCO NON-USER: CPT | Performed by: INTERNAL MEDICINE

## 2025-07-14 PROCEDURE — 3008F BODY MASS INDEX DOCD: CPT | Performed by: INTERNAL MEDICINE

## 2025-07-14 RX ORDER — TOFACITINIB 11 MG/1
11 TABLET, FILM COATED, EXTENDED RELEASE ORAL DAILY
Qty: 90 TABLET | Refills: 3 | Status: SHIPPED | OUTPATIENT
Start: 2025-07-14 | End: 2026-07-14

## 2025-07-14 ASSESSMENT — ENCOUNTER SYMPTOMS
BACK PAIN: 0
EYES NEGATIVE: 1
ENDOCRINE NEGATIVE: 1
GASTROINTESTINAL NEGATIVE: 1
WEAKNESS: 0
MYALGIAS: 0
NUMBNESS: 0
FEVER: 0
NECK PAIN: 1
ARTHRALGIAS: 1
COLOR CHANGE: 0
JOINT SWELLING: 0
COUGH: 0
SHORTNESS OF BREATH: 0
PSYCHIATRIC NEGATIVE: 1
FATIGUE: 0

## 2025-07-14 ASSESSMENT — PATIENT HEALTH QUESTIONNAIRE - PHQ9
SUM OF ALL RESPONSES TO PHQ9 QUESTIONS 1 AND 2: 0
2. FEELING DOWN, DEPRESSED OR HOPELESS: NOT AT ALL
1. LITTLE INTEREST OR PLEASURE IN DOING THINGS: NOT AT ALL

## 2025-07-14 NOTE — PROGRESS NOTES
"                                                [facility] RHEUMATOLOGY     AND INTERNAL MEDICINE    RHEUMATOLOGY PROGRESS NOTE    Eliceo Ojeda 55 y.o. male  :  1970  PCP:  Luly Chirinos MD      Chief Complaint   Patient presents with    Rheumatoid Arthritis       SUBJECTIVE  Pt has been stable with regard to his RA.  Has developed some neck pain.  No known injury.  Used to see a chiropractor  Tolerating medications         Records since last seen reviewed in [facility], [facility] and [facility] Record    Patient Active Problem List    Diagnosis Date Noted    Hyperlipidemia 2025    Cervical spondylosis 2024    Spondylosis without myelopathy or radiculopathy, lumbar region 2024    Long-term current use of tofacitinib 2024    Rheumatoid arthritis with rheumatoid factor of multiple sites without organ or systems involvement (Multi) 2024    Trigger little finger of right hand 2024     Medical History[1]  Medications Ordered Prior to Encounter[2]  RX Allergies[3]  Social History[4]  Review of Systems   Constitutional:  Negative for fatigue and fever.   HENT: Negative.     Eyes: Negative.    Respiratory:  Negative for cough and shortness of breath.    Cardiovascular:  Negative for leg swelling.   Gastrointestinal: Negative.    Endocrine: Negative.    Genitourinary: Negative.    Musculoskeletal:  Positive for arthralgias and neck pain. Negative for back pain, gait problem, joint swelling and myalgias.   Skin:  Negative for color change and rash.   Neurological:  Negative for weakness and numbness.   Psychiatric/Behavioral: Negative.         PHYSICAL EXAM  BP (!) 157/91   Pulse 74   Temp 36.4 °C (97.5 °F)   Ht 1.854 m (6' 1\")   Wt 97 kg (213 lb 12.8 oz)   SpO2 96%   BMI 28.21 kg/m²   Depression: Not at risk (2025)    PHQ-2     PHQ-2 Score: 0     Physical Exam  Vitals reviewed.   Constitutional:       General: He is not in acute distress.     Appearance: " Normal appearance.   HENT:      Head: Normocephalic and atraumatic.   Eyes:      General: No scleral icterus.     Extraocular Movements: Extraocular movements intact.      Conjunctiva/sclera: Conjunctivae normal.      Pupils: Pupils are equal, round, and reactive to light.   Pulmonary:      Effort: Pulmonary effort is normal. No respiratory distress.   Musculoskeletal:         General: No swelling, tenderness or deformity. Normal range of motion.      Cervical back: Normal range of motion and neck supple. No tenderness.      Right lower leg: No edema.      Left lower leg: No edema.      Comments: No synovitis noted on exam  Paraspinal muscle tenderness of neck noted  Contracture of L 5th DIP   Skin:     Findings: No bruising or rash.      Comments: Tattoo on L hand   Neurological:      General: No focal deficit present.      Mental Status: He is alert and oriented to person, place, and time. Mental status is at baseline.      Gait: Gait normal.   Psychiatric:         Mood and Affect: Mood normal.           Health Maintenance   Topic Date Due    HIV Screening  Never done    Colorectal Cancer Screening  Never done    Diabetes Screening  Never done    Hepatitis B Vaccines (1 of 3 - 19+ 3-dose series) Never done    Pneumococcal Vaccine (1 of 1 - PCV) Never done    Zoster Vaccines (1 of 2) Never done    Yearly Adult Physical  07/25/2019    Influenza Vaccine (1) 09/01/2025    Lipid Panel  04/23/2030    DTaP/Tdap/Td Vaccines (3 - Td or Tdap) 01/21/2035    HPV Vaccines (No Doses Required) Completed    Hepatitis C Screening  Addressed    HIB Vaccines  Aged Out    IPV Vaccines  Aged Out    Hepatitis A Vaccines  Aged Out    Meningococcal Vaccine  Aged Out    Rotavirus Vaccines  Aged Out    MMR Vaccines  Discontinued    COVID-19 Vaccine  Discontinued         Assessment/plan  Assessment & Plan  Rheumatoid arthritis with rheumatoid factor of multiple sites without organ or systems involvement (Multi)  On xeljanz therapy.  Stable  on treatment.  Has an unrelated neck strain--advised heat and massage  No signs of arthritis progression on exam.  Patient to continue medications for relief of symptoms and will continue to monitor usage and monitor for any untoward effects.   Encourage mobility and exercise to help range of motion with joints.   A healthy diet to maintain ideal weight helps overall joint pain.    Labs ordered today to monitor for increased disease activity, end organ manifestations and to monitor for any drug toxicities due to chronic medications    Orders:    Follow Up In Rheumatology; Future    CBC and Auto Differential; Future    Comprehensive Metabolic Panel; Future    C-Reactive Protein; Future    Sedimentation Rate; Future    Follow Up In Rheumatology    Xeljanz XR 11 mg tablet extended release 24 hr; Take 1 tablet (11 mg) by mouth once daily.    Long-term current use of tofacitinib  Pt is on immunosuppressive medication to control disease.  No signs of worrisome infections since last seen  No signs of leukopenia that would increase risk.  Recommend that vaccinations be kept up to date to minimize risk    Orders:    Follow Up In Rheumatology; Future    Follow Up In Rheumatology        Follow up: _____months, sooner if change in symptoms    Immunization History   Administered Date(s) Administered    Flu vaccine (IIV4), preservative free *Check age/dose* 10/09/2018, 11/21/2023    Influenza, Unspecified 10/20/2015    PPD Test 01/01/2011    Tdap vaccine, age 7 year and older (BOOSTRIX, ADACEL) 10/15/2015       Tiffanie Currie MD                   [1]   Past Medical History:  Diagnosis Date    Consumes 2 to 3 servings of caffeine per day     COVID-19 vaccine series declined     Dry eye syndrome 01/23/2017    Felty's syndrome 08/30/2012    Hepatitis C infection 08/30/2012    Rheumatoid nodule, unspecified site (Multi) 09/21/2022    Rheumatoid nodules    Unspecified cataract     Cataracts, bilateral   [2]   Current Outpatient  Medications on File Prior to Visit   Medication Sig Dispense Refill    Xeljanz XR 11 mg tablet extended release 24 hr Take 1 tablet (11 mg) by mouth once daily. 90 tablet 3    [DISCONTINUED] rosuvastatin (Crestor) 5 mg tablet Take 1 tablet (5 mg) by mouth once daily at bedtime. (Patient not taking: Reported on 2025)       No current facility-administered medications on file prior to visit.   [3] No Known Allergies  [4]   Social History  Tobacco Use    Smoking status: Former     Current packs/day: 0.00     Types: Cigarettes     Quit date:      Years since quittin.5    Smokeless tobacco: Never   Vaping Use    Vaping status: Never Used   Substance Use Topics    Alcohol use: Yes     Comment: Socially    Drug use: Never

## 2025-07-14 NOTE — LETTER
2025     Luly Chirinos MD  [address]  [address]  [facility] [address]    Patient: Eliceo Ojeda   YOB: 1970   Date of Visit: 2025       Dear Dr. Luly Chirinos MD:    Thank you for referring Eliceo Ojeda to me for evaluation. Below are my notes for this visit.  If you have questions, please do not hesitate to call me. I look forward to following your patient along with you.       Sincerely,     Tiffanie Currie MD      CC: No Recipients  ______________________________________________________________________________________                                                    [facility] RHEUMATOLOGY     AND INTERNAL MEDICINE    RHEUMATOLOGY PROGRESS NOTE    Eliceo Ojeda 55 y.o. male  :  1970  PCP:  Luly Chirinos MD      Chief Complaint   Patient presents with   • Rheumatoid Arthritis       SUBJECTIVE  Pt has been stable with regard to his RA.  Has developed some neck pain.  No known injury.  Used to see a chiropractor  Tolerating medications         Records since last seen reviewed in [facility], [facility]    Patient Active Problem List    Diagnosis Date Noted   • Hyperlipidemia 2025   • Cervical spondylosis 2024   • Spondylosis without myelopathy or radiculopathy, lumbar region 2024   • Long-term current use of tofacitinib 2024   • Rheumatoid arthritis with rheumatoid factor of multiple sites without organ or systems involvement (Multi) 2024   • Trigger little finger of right hand 2024     Medical History[1]  Medications Ordered Prior to Encounter[2]  RX Allergies[3]  Social History[4]  Review of Systems   Constitutional:  Negative for fatigue and fever.   HENT: Negative.     Eyes: Negative.    Respiratory:  Negative for cough and shortness of breath.    Cardiovascular:  Negative for leg swelling.   Gastrointestinal: Negative.    Endocrine: Negative.    Genitourinary: Negative.   "  Musculoskeletal:  Positive for arthralgias and neck pain. Negative for back pain, gait problem, joint swelling and myalgias.   Skin:  Negative for color change and rash.   Neurological:  Negative for weakness and numbness.   Psychiatric/Behavioral: Negative.         PHYSICAL EXAM  BP (!) 157/91   Pulse 74   Temp 36.4 °C (97.5 °F)   Ht 1.854 m (6' 1\")   Wt 97 kg (213 lb 12.8 oz)   SpO2 96%   BMI 28.21 kg/m²   Depression: Not at risk (7/14/2025)    PHQ-2    • PHQ-2 Score: 0     Physical Exam  Vitals reviewed.   Constitutional:       General: He is not in acute distress.     Appearance: Normal appearance.   HENT:      Head: Normocephalic and atraumatic.   Eyes:      General: No scleral icterus.     Extraocular Movements: Extraocular movements intact.      Conjunctiva/sclera: Conjunctivae normal.      Pupils: Pupils are equal, round, and reactive to light.   Pulmonary:      Effort: Pulmonary effort is normal. No respiratory distress.   Musculoskeletal:         General: No swelling, tenderness or deformity. Normal range of motion.      Cervical back: Normal range of motion and neck supple. No tenderness.      Right lower leg: No edema.      Left lower leg: No edema.      Comments: No synovitis noted on exam  Paraspinal muscle tenderness of neck noted  Contracture of L 5th DIP   Skin:     Findings: No bruising or rash.      Comments: Tattoo on L hand   Neurological:      General: No focal deficit present.      Mental Status: He is alert and oriented to person, place, and time. Mental status is at baseline.      Gait: Gait normal.   Psychiatric:         Mood and Affect: Mood normal.           Health Maintenance   Topic Date Due   • HIV Screening  Never done   • Colorectal Cancer Screening  Never done   • Diabetes Screening  Never done   • Hepatitis B Vaccines (1 of 3 - 19+ 3-dose series) Never done   • Pneumococcal Vaccine (1 of 1 - PCV) Never done   • Zoster Vaccines (1 of 2) Never done   • Yearly Adult Physical  " 07/25/2019   • Influenza Vaccine (1) 09/01/2025   • Lipid Panel  04/23/2030   • DTaP/Tdap/Td Vaccines (3 - Td or Tdap) 01/21/2035   • HPV Vaccines (No Doses Required) Completed   • Hepatitis C Screening  Addressed   • HIB Vaccines  Aged Out   • IPV Vaccines  Aged Out   • Hepatitis A Vaccines  Aged Out   • Meningococcal Vaccine  Aged Out   • Rotavirus Vaccines  Aged Out   • MMR Vaccines  Discontinued   • COVID-19 Vaccine  Discontinued         Assessment/plan  Assessment & Plan  Rheumatoid arthritis with rheumatoid factor of multiple sites without organ or systems involvement (Multi)  On xeljanz therapy.  Stable on treatment.  Has an unrelated neck strain--advised heat and massage  No signs of arthritis progression on exam.  Patient to continue medications for relief of symptoms and will continue to monitor usage and monitor for any untoward effects.   Encourage mobility and exercise to help range of motion with joints.   A healthy diet to maintain ideal weight helps overall joint pain.    Labs ordered today to monitor for increased disease activity, end organ manifestations and to monitor for any drug toxicities due to chronic medications    Orders:  •  Follow Up In Rheumatology; Future  •  CBC and Auto Differential; Future  •  Comprehensive Metabolic Panel; Future  •  C-Reactive Protein; Future  •  Sedimentation Rate; Future  •  Follow Up In Rheumatology  •  Xeljanz XR 11 mg tablet extended release 24 hr; Take 1 tablet (11 mg) by mouth once daily.    Long-term current use of tofacitinib  Pt is on immunosuppressive medication to control disease.  No signs of worrisome infections since last seen  No signs of leukopenia that would increase risk.  Recommend that vaccinations be kept up to date to minimize risk    Orders:  •  Follow Up In Rheumatology; Future  •  Follow Up In Rheumatology        Follow up: _____months, sooner if change in symptoms    Immunization History   Administered Date(s) Administered   • Flu  vaccine (IIV4), preservative free *Check age/dose* 10/09/2018, 2023   • Influenza, Unspecified 10/20/2015   • PPD Test 2011   • Tdap vaccine, age 7 year and older (BOOSTRIX, ADACEL) 10/15/2015       Tiffanie Currie MD                     [1]  Past Medical History:  Diagnosis Date   • Consumes 2 to 3 servings of caffeine per day    • COVID-19 vaccine series declined    • Dry eye syndrome 2017   • Felty's syndrome 2012   • Hepatitis C infection 2012   • Rheumatoid nodule, unspecified site (Multi) 2022    Rheumatoid nodules   • Unspecified cataract     Cataracts, bilateral   [2]  Current Outpatient Medications on File Prior to Visit   Medication Sig Dispense Refill   • Xeljanz XR 11 mg tablet extended release 24 hr Take 1 tablet (11 mg) by mouth once daily. 90 tablet 3   • [DISCONTINUED] rosuvastatin (Crestor) 5 mg tablet Take 1 tablet (5 mg) by mouth once daily at bedtime. (Patient not taking: Reported on 2025)       No current facility-administered medications on file prior to visit.   [3]  No Known Allergies  [4]  Social History  Tobacco Use   • Smoking status: Former     Current packs/day: 0.00     Types: Cigarettes     Quit date: 2017     Years since quittin.5   • Smokeless tobacco: Never   Vaping Use   • Vaping status: Never Used   Substance Use Topics   • Alcohol use: Yes     Comment: Socially   • Drug use: Never

## 2025-07-14 NOTE — ASSESSMENT & PLAN NOTE
Pt is on immunosuppressive medication to control disease.  No signs of worrisome infections since last seen  No signs of leukopenia that would increase risk.  Recommend that vaccinations be kept up to date to minimize risk    Orders:    Follow Up In Rheumatology; Future    Follow Up In Rheumatology

## 2025-07-14 NOTE — ASSESSMENT & PLAN NOTE
On xeljanz therapy.  Stable on treatment.  Has an unrelated neck strain--advised heat and massage  No signs of arthritis progression on exam.  Patient to continue medications for relief of symptoms and will continue to monitor usage and monitor for any untoward effects.   Encourage mobility and exercise to help range of motion with joints.   A healthy diet to maintain ideal weight helps overall joint pain.    Labs ordered today to monitor for increased disease activity, end organ manifestations and to monitor for any drug toxicities due to chronic medications    Orders:    Follow Up In Rheumatology; Future    CBC and Auto Differential; Future    Comprehensive Metabolic Panel; Future    C-Reactive Protein; Future    Sedimentation Rate; Future    Follow Up In Rheumatology    Xeljanz XR 11 mg tablet extended release 24 hr; Take 1 tablet (11 mg) by mouth once daily.

## 2025-07-14 NOTE — PATIENT INSTRUCTIONS
"It was a pleasure to see you today  Please call if your symptoms worsen  Please review your summary for education and reminders.  Follow up at your next appointment.    If you had labs/xrays done today, you will be able to view on [employer].   We will contact you when the results are reviewed for further discussion.  Please note that you may receive your results before I have had a chance to review.  Please know I will be contacting you for discussion  Homegoing instructions for all patient  A healthy lifestyle helps chronic diseases  These are all the goals you should strive to improve your overall health   Blood pressure <130/85   BMI of <30 or waist circumference that is 1/2 of your height   Fasting blood sugar <107 (if you are diabetic, aim for an A1c <6.4%_   LDL cholesterol <130   Avoid smoking   Manage your stress   Get your preventive exams   Get your immunizations   What else for RA?    Any type of exercise is better than nothing.  Whether you do cardio, walking, lift weights or yoga, all can help in improving physical function.  Occupational and physical therapy can improve physical function and decrease pain by providing tools and techniques to improve your day.  We can do a referral if you haven't seen one yet  Braces and splints for affected joints can also help  If your gait is affected, strongly recommend assistive devices like canes or walkers.  We don't want to risk injuries from falls.  Can Diet help?   Consider the Mediterranean diet  There are some foods that are considered \"inflammatory\"  Look up anti-inflammatory diets for a list of foods to avoid.  No dietary supplements help unfortunately.  Work on getting to a normal weight/BMI.  This will lessen the stress on your joints.  What else?   Acupuncture   Massage therapy   Apply heat to affected joints  We do not recommend chiropractic care as it has not been shown to help in RA.  If you smoke, stop     (From 2022ACR guidelines for exercise, rehab " and diet in RA)

## 2025-07-15 LAB
ALBUMIN SERPL-MCNC: 4.6 G/DL (ref 3.6–5.1)
ALP SERPL-CCNC: 52 U/L (ref 35–144)
ALT SERPL-CCNC: 17 U/L (ref 9–46)
ANION GAP SERPL CALCULATED.4IONS-SCNC: 9 MMOL/L (CALC) (ref 7–17)
AST SERPL-CCNC: 18 U/L (ref 10–35)
BASOPHILS # BLD AUTO: 10 CELLS/UL (ref 0–200)
BASOPHILS NFR BLD AUTO: 0.2 %
BILIRUB SERPL-MCNC: 0.6 MG/DL (ref 0.2–1.2)
BUN SERPL-MCNC: 15 MG/DL (ref 7–25)
CALCIUM SERPL-MCNC: 9.7 MG/DL (ref 8.6–10.3)
CHLORIDE SERPL-SCNC: 100 MMOL/L (ref 98–110)
CO2 SERPL-SCNC: 28 MMOL/L (ref 20–32)
CREAT SERPL-MCNC: 0.9 MG/DL (ref 0.7–1.3)
CRP SERPL-MCNC: <3 MG/L
EGFRCR SERPLBLD CKD-EPI 2021: 100 ML/MIN/1.73M2
EOSINOPHIL # BLD AUTO: 49 CELLS/UL (ref 15–500)
EOSINOPHIL NFR BLD AUTO: 1 %
ERYTHROCYTE [DISTWIDTH] IN BLOOD BY AUTOMATED COUNT: 14.9 % (ref 11–15)
ERYTHROCYTE [SEDIMENTATION RATE] IN BLOOD BY WESTERGREN METHOD: 6 MM/H
GLUCOSE SERPL-MCNC: 96 MG/DL (ref 65–99)
HCT VFR BLD AUTO: 46.4 % (ref 38.5–50)
HGB BLD-MCNC: 15.1 G/DL (ref 13.2–17.1)
LYMPHOCYTES # BLD AUTO: 2019 CELLS/UL (ref 850–3900)
LYMPHOCYTES NFR BLD AUTO: 41.2 %
MCH RBC QN AUTO: 31.3 PG (ref 27–33)
MCHC RBC AUTO-ENTMCNC: 32.5 G/DL (ref 32–36)
MCV RBC AUTO: 96.3 FL (ref 80–100)
MONOCYTES # BLD AUTO: 466 CELLS/UL (ref 200–950)
MONOCYTES NFR BLD AUTO: 9.5 %
NEUTROPHILS # BLD AUTO: 2357 CELLS/UL (ref 1500–7800)
NEUTROPHILS NFR BLD AUTO: 48.1 %
PLATELET # BLD AUTO: 249 THOUSAND/UL (ref 140–400)
PMV BLD REES-ECKER: 8.5 FL (ref 7.5–12.5)
POTASSIUM SERPL-SCNC: 4.8 MMOL/L (ref 3.5–5.3)
PROT SERPL-MCNC: 7.9 G/DL (ref 6.1–8.1)
RBC # BLD AUTO: 4.82 MILLION/UL (ref 4.2–5.8)
SODIUM SERPL-SCNC: 137 MMOL/L (ref 135–146)
WBC # BLD AUTO: 4.9 THOUSAND/UL (ref 3.8–10.8)

## 2025-07-24 DIAGNOSIS — M05.79 RHEUMATOID ARTHRITIS WITH RHEUMATOID FACTOR OF MULTIPLE SITES WITHOUT ORGAN OR SYSTEMS INVOLVEMENT (MULTI): ICD-10-CM

## 2025-07-24 RX ORDER — TOFACITINIB 11 MG/1
11 TABLET, FILM COATED, EXTENDED RELEASE ORAL DAILY
Qty: 90 TABLET | Refills: 3 | Status: SHIPPED | OUTPATIENT
Start: 2025-07-24 | End: 2026-07-24

## 2025-11-17 ENCOUNTER — APPOINTMENT (OUTPATIENT)
Dept: RHEUMATOLOGY | Facility: CLINIC | Age: 55
End: 2025-11-17
Payer: COMMERCIAL